# Patient Record
Sex: FEMALE | Race: WHITE | Employment: FULL TIME | URBAN - METROPOLITAN AREA
[De-identification: names, ages, dates, MRNs, and addresses within clinical notes are randomized per-mention and may not be internally consistent; named-entity substitution may affect disease eponyms.]

---

## 2018-02-22 ENCOUNTER — OFFICE VISIT (OUTPATIENT)
Dept: FAMILY MEDICINE CLINIC | Facility: CLINIC | Age: 28
End: 2018-02-22

## 2018-02-22 VITALS
RESPIRATION RATE: 20 BRPM | OXYGEN SATURATION: 98 % | WEIGHT: 140 LBS | TEMPERATURE: 98 F | SYSTOLIC BLOOD PRESSURE: 102 MMHG | BODY MASS INDEX: 20.04 KG/M2 | DIASTOLIC BLOOD PRESSURE: 52 MMHG | HEART RATE: 82 BPM | HEIGHT: 70 IN

## 2018-02-22 DIAGNOSIS — R09.82 POST-NASAL DRIP: ICD-10-CM

## 2018-02-22 DIAGNOSIS — R05.9 COUGH: Primary | ICD-10-CM

## 2018-02-22 PROCEDURE — 99213 OFFICE O/P EST LOW 20 MIN: CPT | Performed by: NURSE PRACTITIONER

## 2018-02-22 RX ORDER — BENZONATATE 200 MG/1
200 CAPSULE ORAL 3 TIMES DAILY PRN
Qty: 30 CAPSULE | Refills: 0 | Status: SHIPPED | OUTPATIENT
Start: 2018-02-22 | End: 2018-03-04

## 2018-02-22 RX ORDER — AZITHROMYCIN 250 MG/1
TABLET, FILM COATED ORAL
Qty: 6 TABLET | Refills: 0 | Status: SHIPPED | OUTPATIENT
Start: 2018-02-22 | End: 2020-01-15

## 2018-02-22 NOTE — PROGRESS NOTES
HPI:   Carney Aschoff is a 32year old female who presents with ill symptoms for  2  weeks.  Patient reports sore throat only at the beginning of sx's, dry cough, chest pain from coughing, OTC cold meds have not been helping, vomited once due to cough last care discussed. Medication use and risk/benefit discussed. Patient is advised to follow up with PCP if not improving within 10-14 days of onset of illness or seek immediate care if symptoms worsen.   The patient indicates understanding of these issues and a

## 2018-02-22 NOTE — PATIENT INSTRUCTIONS
·  PLAN: Zithromax, take as directed. Finish all the medication even if you feel better. · Probiotics or yogurt daily during antibiotic use will help decrease stomach upset and restore good bacteria to the gut.   · Benzonatate capsules-take one every [employer]

## 2018-05-07 ENCOUNTER — LAB ENCOUNTER (OUTPATIENT)
Dept: LAB | Facility: HOSPITAL | Age: 28
End: 2018-05-07
Attending: FAMILY MEDICINE
Payer: COMMERCIAL

## 2018-05-07 DIAGNOSIS — Z02.0 ENCOUNTER FOR SCHOOL EXAMINATION: Primary | ICD-10-CM

## 2018-05-07 PROCEDURE — 86762 RUBELLA ANTIBODY: CPT

## 2018-05-07 PROCEDURE — 86787 VARICELLA-ZOSTER ANTIBODY: CPT

## 2018-05-07 PROCEDURE — 86765 RUBEOLA ANTIBODY: CPT

## 2018-05-07 PROCEDURE — 86706 HEP B SURFACE ANTIBODY: CPT

## 2018-05-07 PROCEDURE — 36415 COLL VENOUS BLD VENIPUNCTURE: CPT

## 2018-05-07 PROCEDURE — 86735 MUMPS ANTIBODY: CPT

## 2018-05-07 PROCEDURE — 86480 TB TEST CELL IMMUN MEASURE: CPT

## 2018-05-18 ENCOUNTER — OFFICE VISIT (OUTPATIENT)
Dept: OCCUPATIONAL MEDICINE | Age: 28
End: 2018-05-18
Attending: PHYSICIAN ASSISTANT

## 2020-01-15 ENCOUNTER — OFFICE VISIT (OUTPATIENT)
Dept: FAMILY MEDICINE CLINIC | Facility: CLINIC | Age: 30
End: 2020-01-15
Payer: COMMERCIAL

## 2020-01-15 VITALS
RESPIRATION RATE: 20 BRPM | DIASTOLIC BLOOD PRESSURE: 80 MMHG | OXYGEN SATURATION: 99 % | HEART RATE: 95 BPM | BODY MASS INDEX: 21.62 KG/M2 | SYSTOLIC BLOOD PRESSURE: 104 MMHG | HEIGHT: 70 IN | WEIGHT: 151 LBS | TEMPERATURE: 99 F

## 2020-01-15 DIAGNOSIS — M94.0 COSTOCHONDRITIS, ACUTE: ICD-10-CM

## 2020-01-15 DIAGNOSIS — J11.1 INFLUENZA-LIKE ILLNESS: Primary | ICD-10-CM

## 2020-01-15 PROCEDURE — 99213 OFFICE O/P EST LOW 20 MIN: CPT | Performed by: NURSE PRACTITIONER

## 2020-01-15 RX ORDER — NAPROXEN SODIUM 220 MG
2 TABLET ORAL EVERY 12 HOURS
Qty: 60 TABLET | Refills: 0 | COMMUNITY
Start: 2020-01-15

## 2020-01-15 RX ORDER — BENZONATATE 200 MG/1
200 CAPSULE ORAL 3 TIMES DAILY PRN
Qty: 20 CAPSULE | Refills: 0 | Status: SHIPPED | OUTPATIENT
Start: 2020-01-15 | End: 2020-01-22

## 2020-01-15 NOTE — PATIENT INSTRUCTIONS
Take 2 Aleve every 12 hours OR 3 ibuprofen every 6 hours. Take with food. Costochondritis    Costochondritis is inflammation of a rib or the cartilage that connects a rib to your breastbone (sternum).  It causes tenderness, and sometimes chest pain m times a day. · Perform stretching exercises as directed.   Call the healthcare provider right away if you have any of the following:  · Pain that is not relieved by medicine  · Shortness of breath  · Lightheadedness, dizziness, or fainting  · Feeling of ir

## 2020-01-15 NOTE — PROGRESS NOTES
CHIEF COMPLAINT:   Patient presents with:  Fever: 102 highest on 1/11  Cough: dry. chest wall discomfort on right side with deep breath. OTC meds taken        HPI:   Dallas Tovar is a 30year old female who presents for cough and chest wall pain.   Kristie /80   Pulse 95   Temp 98.5 °F (36.9 °C) (Oral)   Resp 20   Ht 70\"   Wt 151 lb (68.5 kg)   LMP 01/01/2020 (Approximate)   SpO2 99%   Breastfeeding No   BMI 21.67 kg/m²   GENERAL: well developed, well nourished, in no apparent distress  SKIN: no rashe Costochondritis    Costochondritis is inflammation of a rib or the cartilage that connects a rib to your breastbone (sternum). It causes tenderness, and sometimes chest pain may be sharp or aching, or it may feel like pressure.  Pain may get worse with deep Call the healthcare provider right away if you have any of the following:  · Pain that is not relieved by medicine  · Shortness of breath  · Lightheadedness, dizziness, or fainting  · Feeling of irregular heartbeat or fast pulse  Anyone with chest pain deanne